# Patient Record
Sex: MALE | Race: BLACK OR AFRICAN AMERICAN | URBAN - METROPOLITAN AREA
[De-identification: names, ages, dates, MRNs, and addresses within clinical notes are randomized per-mention and may not be internally consistent; named-entity substitution may affect disease eponyms.]

---

## 2017-10-06 ENCOUNTER — OFFICE VISIT (OUTPATIENT)
Dept: INTERNAL MEDICINE CLINIC | Age: 42
End: 2017-10-06

## 2017-10-06 VITALS
DIASTOLIC BLOOD PRESSURE: 58 MMHG | TEMPERATURE: 98.3 F | HEART RATE: 84 BPM | BODY MASS INDEX: 20.83 KG/M2 | WEIGHT: 140.6 LBS | SYSTOLIC BLOOD PRESSURE: 112 MMHG | HEIGHT: 69 IN

## 2017-10-06 DIAGNOSIS — R10.9 STOMACH PAIN: Primary | ICD-10-CM

## 2017-10-06 PROCEDURE — 99203 OFFICE O/P NEW LOW 30 MIN: CPT | Performed by: INTERNAL MEDICINE

## 2017-10-06 RX ORDER — OMEPRAZOLE 20 MG/1
20 CAPSULE, DELAYED RELEASE ORAL DAILY
Qty: 30 CAPSULE | Refills: 1 | Status: SHIPPED | OUTPATIENT
Start: 2017-10-06 | End: 2018-08-15

## 2017-10-06 ASSESSMENT — PATIENT HEALTH QUESTIONNAIRE - PHQ9
1. LITTLE INTEREST OR PLEASURE IN DOING THINGS: 0
SUM OF ALL RESPONSES TO PHQ9 QUESTIONS 1 & 2: 0
2. FEELING DOWN, DEPRESSED OR HOPELESS: 0
SUM OF ALL RESPONSES TO PHQ QUESTIONS 1-9: 0

## 2017-10-06 NOTE — MR AVS SNAPSHOT
After Visit Summary             Jaky Jones   10/6/2017 1:00 PM   Office Visit    Description:  Male : 1975   Provider:  Meir Cagle MD   Department:  [facility]              Your Follow-Up and Future Appointments         Below is a list of your follow-up and future appointments. This may not be a complete list as you may have made appointments directly with providers that we are not aware of or your providers may have made some for you. Please call your providers to confirm appointments. It is important to keep your appointments. Please bring your current insurance card, photo ID, co-pay, and all medication bottles to your appointment. If self-pay, payment is expected at the time of service. Information from Your Visit        Department     Name Address Phone Fax    [facility] [address]  [place] [address] [phone]      Vital Signs     Blood Pressure Pulse Temperature Height Weight Body Mass Index    112/58 (Cuff Size: Medium Adult) 84 98.3 °F (36.8 °C) (Oral) 5' 8.5\" (1.74 m) 140 lb 9.6 oz (63.8 kg) 21.07 kg/m2    Smoking Status                   Never Smoker              Medications and Orders      Allergies           No Known Allergies         Additional Information        Basic Information     Date Of Birth Sex Race Ethnicity Preferred Language    1975 Male Black Non-/Non  English      Preventive Care        Date Due    HIV screening is recommended for all people regardless of risk factors  aged 15-65 years at least once (lifetime) who have never been HIV tested. 1991    Tetanus Combination Vaccine (1 - Tdap) 1995    Cholesterol Screening 2016    Yearly Flu Vaccine (1) 2017            [employer] Signup           Thank you for enrolling in [address] Please follow the instructions below to securely access your online medical record.  [employer] allows you to send messages to your doctor, view your test results, renew your prescriptions, schedule appointments, and more. How Do I Sign Up? 1. In your Internet browser, go to [email or web address]  2. Click on the Sign Up Now link in the Sign In box. You will see the New Member Sign Up page. 3. Enter your [employer] Access Code exactly as it appears below. You will not need to use this code after youve completed the sign-up process. If you do not sign up before the expiration date, you must request a new code. [employer] Access Code: [email or web address]  Expires: 12/5/2017  1:34 PM    4. Enter your Social Security Number ([id number]) and Date of Birth (mm/dd/yyyy) as indicated and click Submit. You will be taken to the next sign-up page. 5. Create a [employer] ID. This will be your [employer] login ID and cannot be changed, so think of one that is secure and easy to remember. 6. Create a [employer] password. You can change your password at any time. 7. Enter your Password Reset Question and Answer. This can be used at a later time if you forget your password. 8. Enter your e-mail address. You will receive e-mail notification when new information is available in [address] 9. Click Sign Up. You can now view your medical record. Additional Information  If you have questions, please contact the physician practice where you receive care. Remember, [employer] is NOT to be used for urgent needs. For medical emergencies, dial 911. For questions regarding your [employer] account call [phone]. If you have a clinical question, please call your doctor's office.

## 2017-10-06 NOTE — PROGRESS NOTES
capsule 1     No current facility-administered medications for this visit. No past medical history on file. No past surgical history on file. No family history on file. Social History     Social History    Marital status: Single     Spouse name: N/A    Number of children: N/A    Years of education: N/A     Occupational History    Not on file. Social History Main Topics    Smoking status: Never Smoker    Smokeless tobacco: Never Used    Alcohol use Yes      Comment: occasionally    Drug use: Not on file    Sexual activity: Not on file     Other Topics Concern    Not on file     Social History Narrative    No narrative on file       Assessment/Plan:  1. Stomach pain  - omeprazole (PRILOSEC) 20 MG . If symptoms not improved come back to office .

## 2018-08-15 ENCOUNTER — OFFICE VISIT (OUTPATIENT)
Dept: INTERNAL MEDICINE CLINIC | Age: 43
End: 2018-08-15

## 2018-08-15 VITALS
SYSTOLIC BLOOD PRESSURE: 102 MMHG | HEART RATE: 68 BPM | HEIGHT: 69 IN | TEMPERATURE: 97.9 F | WEIGHT: 139.2 LBS | BODY MASS INDEX: 20.62 KG/M2 | DIASTOLIC BLOOD PRESSURE: 56 MMHG

## 2018-08-15 DIAGNOSIS — Z00.00 ROUTINE GENERAL MEDICAL EXAMINATION AT A HEALTH CARE FACILITY: Primary | ICD-10-CM

## 2018-08-15 PROCEDURE — 99396 PREV VISIT EST AGE 40-64: CPT | Performed by: INTERNAL MEDICINE

## 2018-08-15 NOTE — PROGRESS NOTES
Sher Robison  YOB: 1975    Date of Service:  8/15/2018    Chief Complaint:   Sher Robison is a 44 y.o. male who presents for complete physical examination. HPI: The patient is here for comprehensive preventative evaluation and evaluation of ongoing medical problems. The patient has been instructed in the benefits of a healthy diet, regular exercise and ideal body weight.     Patient has no chronic health problems.      Patient is doing work out 4 days in a week.      Patient denies any active complaints today.     Patient is not on any medication .     Review of Systems:  Constitutional: Negative for chills, fever, malaise/fatigue and weight loss. HENT: Negative for headaches. , ear discharge, ear pain, hearing loss, sore throat,   blurry vision and double vision. Respiratory: Negative for cough, hemoptysis, sputum production, shortness of breath and wheezing. Cardiovascular: Negative for chest pain, palpitations, orthopnea, claudication and leg swelling. Gastrointestinal: Negative for abdominal pain, nausea and vomiting, constipation, diarrhea, heartburn, blood in stool, , melena. Genitourinary: Negative for dysuria, flank pain, frequency, hematuria and urgency. Musculoskeletal: Negative for back pain, myalgias and neck pain. Neurological: Negative for dizziness, seizure , sensory change, focal weakness,  loss of consciousness . Psychiatric/Behavioral: Negative for depression and substance abuse. The patient does not have insomnia. Vitals:    08/15/18 1333   BP: (!) 102/56   Pulse: 68   Temp: 97.9 °F (36.6 °C)     Wt Readings from Last 3 Encounters:   08/15/18 139 lb 3.2 oz (63.1 kg)   07/28/18 140 lb (63.5 kg)   10/06/17 140 lb 9.6 oz (63.8 kg)     BP Readings from Last 3 Encounters:   08/15/18 (!) 102/56   07/28/18 (!) 103/58   10/06/17 (!) 112/58         Physical Exam:  Constitutional:   appears well-nourished. No distress.     HENT:   Head:

## 2018-08-16 DIAGNOSIS — Z00.00 ROUTINE GENERAL MEDICAL EXAMINATION AT A HEALTH CARE FACILITY: ICD-10-CM

## 2018-08-16 LAB
ANION GAP SERPL CALCULATED.3IONS-SCNC: 11 MMOL/L (ref 3–16)
BASOPHILS ABSOLUTE: 0 K/UL (ref 0–0.2)
BASOPHILS RELATIVE PERCENT: 0.4 %
BILIRUBIN URINE: NEGATIVE
BLOOD, URINE: NEGATIVE
BUN BLDV-MCNC: 12 MG/DL (ref 7–20)
CALCIUM SERPL-MCNC: 9.4 MG/DL (ref 8.3–10.6)
CHLORIDE BLD-SCNC: 100 MMOL/L (ref 99–110)
CHOLESTEROL, TOTAL: 198 MG/DL (ref 0–199)
CLARITY: CLEAR
CO2: 28 MMOL/L (ref 21–32)
COLOR: ABNORMAL
CREAT SERPL-MCNC: 0.8 MG/DL (ref 0.9–1.3)
EOSINOPHILS ABSOLUTE: 0.1 K/UL (ref 0–0.6)
EOSINOPHILS RELATIVE PERCENT: 2 %
EPITHELIAL CELLS, UA: 1 /HPF (ref 0–5)
GFR AFRICAN AMERICAN: >60
GFR NON-AFRICAN AMERICAN: >60
GLUCOSE BLD-MCNC: 79 MG/DL (ref 70–99)
GLUCOSE URINE: NEGATIVE MG/DL
HCT VFR BLD CALC: 44.3 % (ref 40.5–52.5)
HDLC SERPL-MCNC: 76 MG/DL (ref 40–60)
HEMOGLOBIN: 14.8 G/DL (ref 13.5–17.5)
HYALINE CASTS: 2 /LPF (ref 0–8)
KETONES, URINE: NEGATIVE MG/DL
LDL CHOLESTEROL CALCULATED: 97 MG/DL
LEUKOCYTE ESTERASE, URINE: NEGATIVE
LYMPHOCYTES ABSOLUTE: 2 K/UL (ref 1–5.1)
LYMPHOCYTES RELATIVE PERCENT: 45.2 %
MCH RBC QN AUTO: 31.1 PG (ref 26–34)
MCHC RBC AUTO-ENTMCNC: 33.3 G/DL (ref 31–36)
MCV RBC AUTO: 93.4 FL (ref 80–100)
MICROSCOPIC EXAMINATION: YES
MONOCYTES ABSOLUTE: 0.3 K/UL (ref 0–1.3)
MONOCYTES RELATIVE PERCENT: 7 %
NEUTROPHILS ABSOLUTE: 2 K/UL (ref 1.7–7.7)
NEUTROPHILS RELATIVE PERCENT: 45.4 %
NITRITE, URINE: NEGATIVE
PDW BLD-RTO: 13.6 % (ref 12.4–15.4)
PH UA: 6
PLATELET # BLD: 197 K/UL (ref 135–450)
PMV BLD AUTO: 9.4 FL (ref 5–10.5)
POTASSIUM SERPL-SCNC: 4 MMOL/L (ref 3.5–5.1)
PROTEIN UA: ABNORMAL MG/DL
RBC # BLD: 4.75 M/UL (ref 4.2–5.9)
RBC UA: 3 /HPF (ref 0–4)
SODIUM BLD-SCNC: 139 MMOL/L (ref 136–145)
SPECIFIC GRAVITY UA: 1.03
TRIGL SERPL-MCNC: 126 MG/DL (ref 0–150)
TSH SERPL DL<=0.05 MIU/L-ACNC: 1.24 UIU/ML (ref 0.27–4.2)
UROBILINOGEN, URINE: 0.2 E.U./DL
VITAMIN D 25-HYDROXY: 16.3 NG/ML
VLDLC SERPL CALC-MCNC: 25 MG/DL
WBC # BLD: 4.3 K/UL (ref 4–11)
WBC UA: 2 /HPF (ref 0–5)

## 2018-08-17 LAB
ESTIMATED AVERAGE GLUCOSE: 105.4 MG/DL
HBA1C MFR BLD: 5.3 %
HIV AG/AB: NORMAL
HIV ANTIGEN: NORMAL
HIV-1 ANTIBODY: NORMAL
HIV-2 AB: NORMAL

## 2018-08-17 RX ORDER — ERGOCALCIFEROL 1.25 MG/1
50000 CAPSULE ORAL WEEKLY
Qty: 8 CAPSULE | Refills: 0 | Status: SHIPPED | OUTPATIENT
Start: 2018-08-17 | End: 2018-10-06